# Patient Record
Sex: MALE | Race: WHITE | NOT HISPANIC OR LATINO | URBAN - METROPOLITAN AREA
[De-identification: names, ages, dates, MRNs, and addresses within clinical notes are randomized per-mention and may not be internally consistent; named-entity substitution may affect disease eponyms.]

---

## 2018-08-02 ENCOUNTER — EMERGENCY (EMERGENCY)
Facility: HOSPITAL | Age: 38
LOS: 0 days | Discharge: HOME | End: 2018-08-02
Attending: EMERGENCY MEDICINE | Admitting: EMERGENCY MEDICINE
Payer: SELF-PAY

## 2018-08-02 VITALS
RESPIRATION RATE: 18 BRPM | SYSTOLIC BLOOD PRESSURE: 158 MMHG | OXYGEN SATURATION: 100 % | HEART RATE: 70 BPM | TEMPERATURE: 98 F | DIASTOLIC BLOOD PRESSURE: 88 MMHG

## 2018-08-02 VITALS — WEIGHT: 190.04 LBS | HEIGHT: 69 IN

## 2018-08-02 DIAGNOSIS — L25.9 UNSPECIFIED CONTACT DERMATITIS, UNSPECIFIED CAUSE: ICD-10-CM

## 2018-08-02 PROCEDURE — 99283 EMERGENCY DEPT VISIT LOW MDM: CPT

## 2018-08-02 RX ORDER — CLOTRIMAZOLE AND BETAMETHASONE DIPROPIONATE 10; .5 MG/G; MG/G
1 CREAM TOPICAL
Qty: 45 | Refills: 0 | OUTPATIENT
Start: 2018-08-02 | End: 2018-08-08

## 2018-08-02 NOTE — ED STATDOCS - OBJECTIVE STATEMENT
36 y/o male with no relevant PMHx presents to the ED c/o rash to groin x2 days. Pt states that it is itchy. No fever or any other acute complaints at this time.

## 2018-08-02 NOTE — ED STATDOCS - SKIN, MLM
+erythematous rash in the pubic region and b/l lower inguinal folds. No satellite lesions. no crusting. no discharge. no vesicles. No signs of cellulitis.

## 2018-08-02 NOTE — ED STATDOCS - PHYSICAL EXAMINATION
GEN: AOX3, NAD. HEENT: Throat clear. Head NC/AT. NECK: Supple, No JVD. FROM. C-spine non-tender. CV:S1S2, RRR, LUNGS: CTA b/l, no w/r/r. ABD: Soft, NT/ND, no rebound, no guarding. No CVAT. EXT: No e/c/c. 2+ distal pulses. SKIN:  +Non specific erythematous rash noted on pubic area and right upper groin. ?Questionable Tinea Cruris NEURO: No focal deficits. CN II-XII intact. FROM. 5/5 motor and sensory. [place]

## 2018-08-02 NOTE — ED STATDOCS - MEDICAL DECISION MAKING DETAILS
Possible tinea cruris vs contact dermatitis.  Given clotrimazole, steroids.  D/c home in good condition.  F/u with PCP.

## 2018-08-02 NOTE — ED STATDOCS - PROGRESS NOTE DETAILS
Patient is feeling much better, tests/labs reviewed. case discussed with attending. OK to dc home. [place]

## 2020-07-08 NOTE — ED STATDOCS - EXITCARE/DISCHARGE INSTRUCTIONS
detailed exam color normal/warm and dry [employer] and print the 'Prescriptions from this Visit' Report
